# Patient Record
Sex: FEMALE | Race: WHITE
[De-identification: names, ages, dates, MRNs, and addresses within clinical notes are randomized per-mention and may not be internally consistent; named-entity substitution may affect disease eponyms.]

---

## 2018-08-27 ENCOUNTER — HOSPITAL ENCOUNTER (OUTPATIENT)
Dept: HOSPITAL 95 - LAB SHORT | Age: 83
Discharge: HOME | End: 2018-08-27
Attending: INTERNAL MEDICINE
Payer: COMMERCIAL

## 2018-08-27 DIAGNOSIS — M54.9: Primary | ICD-10-CM

## 2019-02-21 ENCOUNTER — HOSPITAL ENCOUNTER (EMERGENCY)
Dept: HOSPITAL 95 - ER | Age: 84
Discharge: HOME | End: 2019-02-21
Payer: MEDICARE

## 2019-02-21 VITALS — BODY MASS INDEX: 21.68 KG/M2 | HEIGHT: 64 IN | WEIGHT: 127.01 LBS

## 2019-02-21 DIAGNOSIS — G40.909: ICD-10-CM

## 2019-02-21 DIAGNOSIS — I10: ICD-10-CM

## 2019-02-21 DIAGNOSIS — M25.551: ICD-10-CM

## 2019-02-21 DIAGNOSIS — K21.9: ICD-10-CM

## 2019-02-21 DIAGNOSIS — M79.644: ICD-10-CM

## 2019-02-21 DIAGNOSIS — M25.552: Primary | ICD-10-CM

## 2019-02-21 DIAGNOSIS — Z79.899: ICD-10-CM
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
## 2019-02-21 DIAGNOSIS — R07.89: ICD-10-CM

## 2019-02-21 LAB
ANION GAP SERPL CALCULATED.4IONS-SCNC: 7 MMOL/L (ref 6–16)
BASOPHILS # BLD AUTO: 0.04 K/MM3 (ref 0–0.23)
BASOPHILS NFR BLD AUTO: 1 % (ref 0–2)
BUN SERPL-MCNC: 16 MG/DL (ref 8–24)
CALCIUM SERPL-MCNC: 9.6 MG/DL (ref 8.5–10.1)
CHLORIDE SERPL-SCNC: 110 MMOL/L (ref 98–108)
CO2 SERPL-SCNC: 25 MMOL/L (ref 21–32)
CREAT SERPL-MCNC: 0.59 MG/DL (ref 0.4–1)
DEPRECATED RDW RBC AUTO: 43.4 FL (ref 35.1–46.3)
EOSINOPHIL # BLD AUTO: 0.18 K/MM3 (ref 0–0.68)
EOSINOPHIL NFR BLD AUTO: 4 % (ref 0–6)
ERYTHROCYTE [DISTWIDTH] IN BLOOD BY AUTOMATED COUNT: 12.3 % (ref 11.7–14.2)
GLUCOSE SERPL-MCNC: 97 MG/DL (ref 70–99)
HCT VFR BLD AUTO: 43.9 % (ref 33–51)
HGB BLD-MCNC: 14.8 G/DL (ref 11.5–16)
IMM GRANULOCYTES # BLD AUTO: 0.01 K/MM3 (ref 0–0.1)
IMM GRANULOCYTES NFR BLD AUTO: 0 % (ref 0–1)
LYMPHOCYTES # BLD AUTO: 0.95 K/MM3 (ref 0.84–5.2)
LYMPHOCYTES NFR BLD AUTO: 19 % (ref 21–46)
MCHC RBC AUTO-ENTMCNC: 33.7 G/DL (ref 31.5–36.5)
MCV RBC AUTO: 96 FL (ref 80–100)
MONOCYTES # BLD AUTO: 0.51 K/MM3 (ref 0.16–1.47)
MONOCYTES NFR BLD AUTO: 10 % (ref 4–13)
NEUTROPHILS # BLD AUTO: 3.28 K/MM3 (ref 1.96–9.15)
NEUTROPHILS NFR BLD AUTO: 66 % (ref 41–73)
NRBC # BLD AUTO: 0 K/MM3 (ref 0–0.02)
NRBC BLD AUTO-RTO: 0 /100 WBC (ref 0–0.2)
PLATELET # BLD AUTO: 209 K/MM3 (ref 150–400)
POTASSIUM SERPL-SCNC: 3.9 MMOL/L (ref 3.5–5.5)
SODIUM SERPL-SCNC: 142 MMOL/L (ref 136–145)
SP GR SPEC: 1.01 (ref 1–1.02)
UROBILINOGEN UR STRIP-MCNC: (no result) MG/DL
WBC #/AREA URNS HPF: (no result) /HPF (ref 0–5)

## 2019-02-21 PROCEDURE — P9612 CATHETERIZE FOR URINE SPEC: HCPCS

## 2019-03-11 ENCOUNTER — HOSPITAL ENCOUNTER (OUTPATIENT)
Dept: HOSPITAL 95 - LAB | Age: 84
Discharge: HOME | End: 2019-03-11
Attending: INTERNAL MEDICINE
Payer: COMMERCIAL

## 2019-03-11 DIAGNOSIS — M54.9: Primary | ICD-10-CM

## 2019-03-11 LAB
LEUKOCYTE ESTERASE UR QL STRIP: (no result)
SP GR SPEC: 1.01 (ref 1–1.02)
UROBILINOGEN UR STRIP-MCNC: (no result) MG/DL
WBC #/AREA URNS HPF: (no result) /HPF (ref 0–5)

## 2019-04-29 ENCOUNTER — HOSPITAL ENCOUNTER (OUTPATIENT)
Dept: HOSPITAL 95 - LAB SHORT | Age: 84
Discharge: HOME | End: 2019-04-29
Attending: NURSE PRACTITIONER
Payer: COMMERCIAL

## 2019-04-29 DIAGNOSIS — Z12.4: Primary | ICD-10-CM

## 2019-04-29 PROCEDURE — G0123 SCREEN CERV/VAG THIN LAYER: HCPCS

## 2019-05-01 LAB — OTHER STN SPEC: (no result)

## 2020-05-14 NOTE — NUR
PT APPEARS SCARED, ANXIOUS, UNCOMFORTABLE. PUSHING STAFF AWAY. VERY RESTLESS.
PT REMOVING CLOTHES, ATTENDS, AND BEDDING. NOT REDIRECTABLE. PT SPIT MEDS OUT.
NOT SWALLOWING FLUIDS OR APPLESAUCE. ALPRAZOLAM PO GIVEN DISSOLVED IN WATER
INTO PT'S CHEEK W/LITTLE EFFECT. CALL TO HOSPITALIST JOSEFINA BAUTISTA NP.
RECIEVED ORDER FOR ONE TIME ATIVAN AND IV KEPPRA DUE TO SPITTING HS KEPPRA
DOSE OUT.

## 2020-05-14 NOTE — NUR
SUMMARY
 
PT RESTING QUIETLY IN BED, SCREAMS AND CALLS OUT WHEN ANY CARES DONE WITH HER,
PT HAD TO BE FED BREAKFAST, PT REFUSED LUNCH, PT MED PER EMAR FOR PAIN, R
SHOULDER IMMOBILIZER PLACED, SPOKE WITH SPOUSE ON THE PHONE, CARE MANAGEMENT
SPOKE WITH THE SON ON THE PHONE, POSSIBILE DC BACK TO [facility] TOMORROW,
VSS, WILL CONT TO MONITOR

## 2020-05-14 NOTE — NUR
SHIFT SUMMARY
PT WAS A NEW ADMIT DURING THE NIGHT. SHE WAS ADMITTED AFTER AN UNWITNESSED
FALL AT [facility]. PT HAS DEMENTIA, AND YELLS AND SCREAMS WHENEVER TOUCHED
OR CARE IS ATTEMPTED. ON ADMISSION, PT WAS INCONSOLIBLE AND HAD ALREADY
RECEIVED PAIN MEDS. THE HOSPITALIST DR GONZALEZ ORDERED A OT DOSE OF ATIVAN. PT
SLEPT WELL AFTER MEDICATING. SHE WAS MEDICATED ONCE FOR RUE PAIN THIS AM WITH
PRN IV MORPHINE. VITALS STABLE. BROWN PATENT AND DRAINING. NO OTHER ACUTE
CHANGES IN PT CONDITION NOTED SINCE ADMISSION. REPORT GIVEN TO ONCOMING RN.

## 2020-05-14 NOTE — NUR
Initial spiritual care note:
 
Mrs. Coto was sleeping. she opened eyes briefly to voice, but could not stay
awake or engage.  She appears comfortable and peaceful.  Prayed at bedisde.  I
will remain available.

## 2020-05-15 NOTE — NUR
SHIFT SUMMARY:
VSS. AFEB. A/OX1. SLEPT THROUGH MOST OF THE NIGHT EXCEPT WHEN INTERACTED WITH.
MORPHINE ADMINISTERED FOR CRYING, MOANING AND HOLDING R SHOULDER YELLING "NO"
AT STAFF AS INDICATION OF PAIN, HELPFUL. F/C PATENT AND DRAINING CLEAR, DARK
YELLOW URINE TO GRAVITY. BED LOW, BED ALARM ON, FREQUENT CHECKS. PT DOES NOT
CALL OUR OR USE CALL BUTTON WHEN IN NEED OF ASSIST. NO ACUTE CHANGES
OVERNIGHT. WILL CONT TO MONITOR.

## 2020-05-15 NOTE — NUR
2200
THIS RN IN ROOM FINDING PT penitentiary OOB, CLIMBING OVER SIDERAILS. THIS RN HAD
PREVIOUSLY MEDICATED FOR S/S PAIN.  THIS RN AND SECOND RN IN ROOM
REPOSITIONING PT, CNA THEN ENTERED ROOM. PT SCREAMING AT STAFF, FLAILING ARMS
AND PUSHING STAFF AWAY. CHARGE RN IN ROOM ASSESSING SITUATION. DISCUSSED NEED
FOR PT TO BE TRANSPORTED TO THE SCU FOR FURTHER MONITORING.  THIS RN SPEAKING
TO MIKE ROCHA, REGARDING PT'S BEHAVIOR. ORDERS RECEIVED. REPORT GIVEN TO AGUILAR ESCALANTE.

## 2020-05-15 NOTE — NUR
PATIENT RESTING IN BED AFTER IV ATIVAN 0.5 MG GIVEN PRIOR TO TRANSFER. PATIENT
NOT SCREAMING AT THIS TIME. ADVANCE DEMENTIA. BED ALARM ACTIVATED.

## 2020-05-15 NOTE — NUR
SUMMARY
 
PT REMAINS CONFUSED T/O THE DAY, CALLS OUT AND YELLS WHENEVER ANY CARES ARE
DONE, PT NEEDS TO BE FED, TAKES PILLS CRUSHED IN APPLESAUCE AFTER COAXING,
SPOKE WITH HER SON CHANDNI ON THE PHONE TODAY WITH UPDATES, PT CONT TO PULL OFF
THE SHOULDER IMMOBILIZER, DOES NOT ALLOW FOR IT TO BE PLACED BACK ON DESPITE
MULTIPLE ATTEMPTS, PT CALMER WITH IT OFF, PT RESISTANT TO CARES AT TIMES, VSS,
WILL CONT TO MONITOR

## 2020-05-15 NOTE — NUR
ASSUMED CARE
RECEIVED REPORT FROM AGUILAR FARFAN. ASSUMED CARE OF PT. COMFORTABLE WHEN
UNDISTURBED, SCREAMS AT STAFF WITH CARES AND REPOSITIONING. NO S/S ACUTE
DISTRESS NOTED AT THIS TIME, ASSISTED TO REPOSITION IN BED, REPLACE ATTENDS
PULLED OFF BY PT. PT LEFT IN POSITION OF COMFORT, CALL LIGHT IN REACH, BED IN
LOWEST POSITION WITH ALARMS ON. WILL CONTINUE TO MONITOR.

## 2020-05-15 NOTE — NUR
Initial palliative care consult:
 
Jocelyn is an 84 year old with a history of severe dementia, seizures, HTN,
right breast cancer.  She lives at [facility] and had a fall.  She currently
has a right humeral fracture and an acute on chronic subdural hematoma.  She
currently has an immobilizer on her right arm.
 
Jocelyn appeared very uncomfortable when this writer entered her room.  She
is attempting to remove the immobilizer and is laying sideways in her bed.
Left inner forearm IV site is infiltrated.  IVF stopped and IV dc'd intact.
Restarted a new IV in the outer left forearm with assistance from the CNA.
Jocelyn is tachypnic and appears to be painful.  Nursing notified of IV
infiltration, new IV and pt's pain.  Nursing medicated pt for pain with IV
pain medication.  Pt appeared much less anxious and her respirations became
less labored after pain medication was administered.  Will plan to contact MD
for PO pain med alternative.  Nursing reports plan it to manage pt's humeral
fracture with immobilizer and no surgical intervention.
 
PC to remain available for symptom management.

## 2020-05-15 NOTE — NUR
UPDATED SON
SPOKE WITH SHEEBA TARIQ ON THE PHONE, UPDATED HIM THAT THE PT WILL BE STAYING FOR
A FEW DAYS FOR IV ANTIBIOTICS

## 2020-05-16 NOTE — NUR
SHIFT SUMMARY-
PT ORIENTED TO SELF ONLY. PT FEARFUL AND CRIES OUT WHENEVER TOUCHED. PRN
ATIVAN GIVEN X1, PRN MORPHINE AND NORCO GIVEN X1 FOR PAIN TO JAIME. PT PULLS ON
IMMOBILIZER FREQUENTLY. LS CLEAR/ DIMINISHED IN THE BASES, ON RA. ABD MODERATE
DISTENTION. PT HAS VERY POOR PO INTAKE, NEEDS FEEDING ASSISTANCE. BLADDER SCAN
  DONT THIS SHIFT. PT STARTED ON NS AT 100ML/HR. PALLIATIVE CARE
CONSULTED AND CALLED AND SPOKE WITH SON. PLAN FOR PT TO RETURN TO [facility]
ON MONDAY. NO OTHER ACUTE CHANGES THIS SHIFT.

## 2020-05-16 NOTE — NUR
Clinical Visit:
 
Pt resting comfortably. She has been given ativan and morphine for treatment
of anxiety and pain. Nurse states that she has been sleeping restfully since
medication. Without medication treatment, pt is very restless and scared. She
has no orientation to where she is, and does not recognize anyone around her.
Additionally, she is in extreme pain when untreated with pain medications. Pt
was given Norco once by the nurse today with minimal, short term relief.
Morphine seems to help the pt most with her discomfort at this time.
 
Call placed to the pt's son, Angel. Questions regarding care are answered. He
is very concerned about her wellbeing and states that his father is very
distressed over not being able to see her for the last two months for COVID
precautions. Reviewed the pt's symptoms with Angel. He is requesting a muscle
relaxer so that "she would be too weak to do anything that would hurt
herself." Reviewed the medications given at this time. Instructed that Ativan
has been given for anxiety. He is concerned about narcotic medications. Norco
and morphine reviewed. Pt has required narcotics for the amount of pain she
has been in. The hope is that she will gradually wean off of those medications
as she heals. Pt is a full assist at meals and requires maximum help to eat.
She cannot get the spoon or fork to her mouth and this has to be done for her.
 
Discussed advanced care planning. Reviewed hospice qualifications for dementia
patients and trajectory of disease process. He is open to the discussion and
asks questions. Instructed to discuss with pt's PCP about when it would be
appropriate to start a hospice care plan. Reviewed nurse visits, philosophy,
and symptom management. Instructed that if this injury causes extraordinary
pain and suffering, then hospice can help treat symptoms. Pt is at end stage
disease at this time and would qualify. This information is also given to the
son.
 
Pt has an appropriate POLST form on file. Palliative care will remain
available for symptom management. Pt does not swallow medications well, so pt
may benefit from liquid, SL forms of ativan and morphine if discharged with
pain issues and this is the only medication that is effective.

## 2020-05-16 NOTE — NUR
PT NOTED TO HAVE VERY POOR ORAL INTAKE, PT HAS NOT VOIDED YET THIS SHIFT.
BLADDER SCAN X2 COMPLETED WITH RESULTS  AND 304ML. SPOKE WITH DR BASS
AND ORDERS RECEIVED FOR NS AT 100ML/HR AT THIS TIME.

## 2020-05-16 NOTE — NUR
SHIFT SUMMARY
PATIENT HAD NO ACUTE CHANGES OBSERVED. TRANSFER FROM ROOM 342. NOT ORIENTED
WITH HX ADVANCED DEMENTIA. BEDREST. PIV REMAINS INTACT. PATIENT RECEIVED 0.5
MG IV ATIVAN PRIOR TO ROOM TRANSFER AND ABLE TO SLEEP. NO SCREAMING AT THIS
TIME. REFUSED RIGHT SHOULDER IMMOBILIZER. TAKES MEDICATION CRUSHED IN APPLE
SAUCE. VSS/AFEBRILE. NO BP ON RIGHT ARM. NO S/SX OF SOB AND N/V. BED ALARM
ACTIVATED. CALL LIGHT IN REACH. BED IN LOWEST POSITION. WILL CONTINUE TO
MONITOR UNTIL DAY SHIFT NURSE ASSUMES CARE.

## 2020-05-16 NOTE — NUR
PATIENT RESTLESS IN BED WITH ANXIETY. SIGNS OF PAIN TO RIGHT SHOULDER. IV
ATIVAN 0.5 MG GIVEN AND IV MORPHINE 2 MG GIVEN PER EMAR. PATIENT NOT
COMMUNICATING AT THIS TIME. PATIENT SWINGS LEGS OVER GUARD RAIL AND BACK
REPEATING. BED ALARM ACTIVATED. WILL CONTINUE TO MONITOR.

## 2020-05-17 NOTE — NUR
SHIFT SUMMARY
PATIENT HAD NO ACUTE CHANGES OBSERVED. AXOX TO SELF,BEDREST AND NON
COMMUNICATING.  PATIENT FEARFUL WITH ANXIETY. IV ATIVAN AND IV MORPHINE GIVEN
FOR ANXIETY AND PAIN TO JAIME. PIV REMAINS INTACT. NS INFUSING AT 100ML/HR. IV
KEPPRA INFUSED PER EMAR. VSS/AFEBRILE. NO S/SX OF SOB AND N/V. HX ADVANCED
DEMENTIA. BED ALARM ACTIVATED AND IN LOWEST POSITION. ON CAMERA. CALL LIGHT IN
REACH. WILL CONTINUE TO MONITOR UNTIL DAY SHIFT NURSE ASSUMES CARE.

## 2020-05-17 NOTE — NUR
SHIFT SUMMARY:
 
AROUSES TO HER NAME AND SOUNDS, NOT ORIENTED, CANNOT ANSWER QUESTIONS, BUT
DOES FOLLOW SOME COMMANDS. WAS IN CONSIDERABLE PAIN AT START OF SHIFT; IV SITE
INFILTRATED, NEW IV SITE PLACED AND MORPHINE AND ATIVAN GIVEN WITH GOOD
EFFECT. HAS GOOD BED MOBILITY, SCOOTS BACK ON TO HER BACK AND DOWN IN THE BED
AFTER REPOSITIONING. WEARING TEDS. INCONT OF B&B, WEARING ATTENDS, NO BM THIS
SHIFT. NEEDS FULL FEEDING ASSISTANCE. BREATHING IS SHALLOW AND TACHYPNEIC AT
TIMES. IVF INFUSING WITHOUT INCIDENT. PLAN IS POSSIBLE D/C BACK TO [facility]
TOMORROW.

## 2020-05-17 NOTE — NUR
Clinical Visit:
 
Pt appears very uncomfortable. CNA attempting to feed her. She is moaning and
upset. She is agitated. Nurse to medicate.
 
Pt qualifies for hospice care for end stage dementia. Will remain available.

## 2020-05-17 NOTE — NUR
CAMERA TECH REPORTS PATIENT TURNS HORIZONTAL IN BED X FIVE. PATIENT AGITATED
AND IN PAIN. IV ATIVAN AND IV MORPHINE GIVEN PER EMAR. BED ALARM ACTIVE WILL
CONTINUE TO MONITOR.

## 2020-05-18 NOTE — NUR
Comfort Care:
 
Spoke with the pt's son. He has been in contact with Fabian Dickson. He reports
that the nurse at [facility] was going to fax the pt's PCP with a referal for
hospice care. Instructed that hospice can be planned in the pt's discharge
from the hospital, so that everything is set up before she goes home. He
states that he would prefer this plan. Reviewed pt's pain management. Comfort
measures will be placed for improvement in symptoms with the hope that she is
well managed with comfort medications before returning to [facility]. She has been
requiring IV morphine everyday for pain control. Son is asking if he and his
father can come see the pt tomorrow, this is acceptable. Pt's  has not
been able to see his wife for a couple months now, due to COVID19 quarentine
and has been worried about her. It would be beneficial for the family to come
see her and ask questions. Reviewed pt's prior condition of function complared
to now. He is asking for adaptive utensils to be provided to her - instructed
that pt has not been feeding herself for the duration of her hospital stay.
She has been difficult to redirect and difficult to manage pain and anxiety.
 
Call placed to Dr. Franklin. Comfort Measures orders placed. Spoke to nurse,
Prema, with update on care plan changes. Will remain available.

## 2020-05-18 NOTE — NUR
IV BEGAN LEAKING DURING 10ML NS FLUSH FOLLOWING IV ATIVAN ADMINISTRATION. IV
WAS DC'D AND NEW 22G IV PLACED TO L.WRIST FOR PRN COMFORT CARE MEDS. ROXINOL
20MG SL WAS ALSO RECIEVED AT THIS TIME FOR S/S PAIN AND AGGITATION. PT HAS NOW
CALMED DOWN AND APPEARS MUCH MORE COMFORTABLE FOLLOWING MEDS SO IT IS ASSUMED
THAT DOSE OF IV ATIVAN WAS EFFECTIVELY RECIEVED. WCTM AND MED PRN.

## 2020-05-18 NOTE — NUR
PT ON COMFORT CARE AND IS SLEEPING AT THIS TIME W/O S/S DISTRESS. RESPS E/U.
NO PAIN OR ANXIETY NOTED. BED ALARM ON AND MUSIC PLAYING IN BACKGROUND.

## 2020-05-18 NOTE — NUR
SHIFT SUMMARY
PT TRANSITIONED TO COMFORT CARE THIS SHIFT. PT COMFORTABLE MOST THE SHIFT.
THIS AFTERNOON PT BECAME VERY PAINFUL AND RESTLESS FOR ABOUT AN HOUR. PT NOW
STARTING TO RELAX IN BED. PT SON UPDATED THIS SHIFT. PT ARM IN IMOBILIZER FOR
COMFORT. WILL CONTINUE TO MONITOR UNTIL TURNOVER IS COMPLETE.

## 2020-05-18 NOTE — NUR
Clinical Visit:
 
Pt resting in bed. She is moving her legs, but does not appear uncomfortable
at this time. She has been medicated earlier today by nursing for pain and
anxiety.

## 2020-05-18 NOTE — NUR
SHIFT SUMMARY
PATIENT AGITATED AND FEARFUL FIRST HALF OF SHIFT. FACE PAIN SCALE USE FOR
RIGHT SHOULDER PAIN ASSESSMENT. IV ATIVAN AND IV MORPHINE GIVEN PER EMAR.
CAMERA MONITOR REPORTED PATIENT MOVED TO HORIZONTAL POSITION WITH LEGS OVER
THE RAILS FIVE PLUS TIMES. PATIENT REPOSITIONED. NOT ABLE TO FOLLOW
DIRECTIONS. HX ADVANCED DEMENTIA. PIV REMAINS INTACT. NS INFUSING AT 100mL/HR.
IV KEPPRA INFUSED. PURSED LIP BREATHING. NON COMMUNICATING. BED IN LOWEST
POSITION AND ALARM ACTIVATED. CALL LIGHT IN REACH. WILL CONTINUE TO MONITOR
UNTIL DAY SHIFT NURSE ASSUMES ARE.

## 2020-05-18 NOTE — NUR
PT HEARD CRYING AND MOANING AND DIFFICULT TO CONSOLE. SHE APPEARED PAINFUL AND
ANXIOUS AND HAD PUSHED HER FOOT THROUGH THE BED RAIL. STAFF REPOSITIONED HER
FOR IMPROVED COMFORT BUT A VERY SMALL SKIN TEAR WAS OBSERVED TO HER R.DAY
WHERE SHE'D SCRAPED HER LEG ON THE RAIL. PICS WERE TAKEN AND A BANDAID
APPLIED. PT WAS MEDICATED FOR PAIN AND ANXIETY PRN.

## 2020-05-19 NOTE — NUR
and son into see pt.  very frail and distraught he has not
seen her in three months. supportive care of family and review of pt symptoms
with nurisng.

## 2020-05-19 NOTE — NUR
PT MEDICATED FOR PAIN AT THIS TIME, FAMILY WAS IN TO SEE THE PT, THE PT WAS
ABLE TO RESPOND TO HER FAMILY, THE PT SLEPT FOR MOST OF THE DAY, PT APPEARED
TO BE COMFOTABLE FOR MOST OF THE DAY EXPECT FOR POSITION CHANGES, CALL LIGHT
IN REACH WILL CONTINUE TO MONITOR AND ASSESS FOR CHANGES

## 2020-05-19 NOTE — NUR
Initial spiritual carfe note:
 
Mrs. Coto is quite confused and unable to engage.  She moans slighly, but
does not appear to be in pain.  I stroked her forehead, spoke calm words of
assurance, and provided audible prayer.  She appears well cared-for by
nursing.  I will remain available to pt and family.

## 2020-05-19 NOTE — NUR
PT WAS REPOSITIONED, CLEANED AND CHANGED, REPOSTIONING IS PAINFUL FOR THE PT
AND THE PT WAS MEDICATED FOR PAIN

## 2020-05-20 NOTE — NUR
Routine spiritual care note:
 
Mrs. Coto was alone in room, opened eyes to touch, and did not respond
verbally.  she appears frail and sleepy.  Stroked her forehead, assured her
she was loved, and prayed.  She is going home today.

## 2020-05-20 NOTE — NUR
PT AGITATED WITH NONVERBAL SIGNS OF PAIN. ROXANOL ADMINISTERED PER EMAR AND
PERIPHERAL IV DC'D IN PREPARATION FOR DISCHARGE

## 2020-05-20 NOTE — NUR
pt discharged
THE PT DISCHARGED TO [facility] ON HOSPICE CARE, THE PT WAS MEDICATED PRIOR
TO DC WITH MORPHINE AND ATIVAN, PTS FAMILY WAS INFORMED OF THE TRANSFER, PT
WAS TRANSFERED VIA STRETCHER ACCPMANIED BY [facility]

## 2020-05-20 NOTE — NUR
SUMMARY: PT REMAINS ON COMFORT CARE AND IS MOSTLY NONVERBAL W/INABILITY TO
SPECIFY NEEDS. SHE SLEPT MAJORITY OF SHIFT BUT OPENS EYES TO VOICE AND REACTS
DURING REPOSITIONING AND ADL'S. TURN SCHEDULE WAS MAINTAINED W/HEEL PROTECTORS
IN PLACE AND PILLOWS PROVIDED FOR SBD PREVENTION. SHE GRIMACES AND MOANS WHEN
IN PAIN W/ROXINOL 20MG SL RECIEVED PRN X1. PT HASN'T BEEN RESTLESS AT ALL THIS
SHIFT AND HASN'T THROWN LEGS OVER RAILS AS PREVIOUS. NO S/S ANXIETY OR
AGGITATION OBSERVED SO PRN ATIVAN HASN'T BEEN REQUIRED. SHE TOLERATED LIQ MEDS
BUT PO CRUSHED PILLS WERE HELD PER RN JUDGEMENT. RESP RATE SEEMS TO BE
SLOWING BUT RESPS E/U AND NO ORAL SECRETIONS OBSERVED. PT IS PENDING D/C TO
[facility] ON HOSPICE. [facility] AND REPORT TO DAY RN.